# Patient Record
Sex: MALE | Race: WHITE | NOT HISPANIC OR LATINO | Employment: OTHER
[De-identification: names, ages, dates, MRNs, and addresses within clinical notes are randomized per-mention and may not be internally consistent; named-entity substitution may affect disease eponyms.]

---

## 2019-11-02 ENCOUNTER — HEALTH MAINTENANCE LETTER (OUTPATIENT)
Age: 74
End: 2019-11-02

## 2020-01-19 ENCOUNTER — OFFICE VISIT (OUTPATIENT)
Dept: URGENT CARE | Facility: URGENT CARE | Age: 75
End: 2020-01-19
Payer: COMMERCIAL

## 2020-01-19 VITALS
DIASTOLIC BLOOD PRESSURE: 72 MMHG | HEIGHT: 70 IN | BODY MASS INDEX: 27.92 KG/M2 | TEMPERATURE: 99.5 F | SYSTOLIC BLOOD PRESSURE: 128 MMHG | RESPIRATION RATE: 16 BRPM | OXYGEN SATURATION: 98 % | HEART RATE: 78 BPM | WEIGHT: 195 LBS

## 2020-01-19 DIAGNOSIS — J10.1 INFLUENZA A: ICD-10-CM

## 2020-01-19 DIAGNOSIS — R05.9 COUGH: Primary | ICD-10-CM

## 2020-01-19 LAB
FLUAV+FLUBV AG SPEC QL: NEGATIVE
FLUAV+FLUBV AG SPEC QL: POSITIVE
SPECIMEN SOURCE: ABNORMAL

## 2020-01-19 PROCEDURE — 99202 OFFICE O/P NEW SF 15 MIN: CPT | Performed by: PHYSICIAN ASSISTANT

## 2020-01-19 PROCEDURE — 87804 INFLUENZA ASSAY W/OPTIC: CPT | Performed by: PHYSICIAN ASSISTANT

## 2020-01-19 RX ORDER — OSELTAMIVIR PHOSPHATE 75 MG/1
75 CAPSULE ORAL 2 TIMES DAILY
Qty: 10 CAPSULE | Refills: 0 | Status: SHIPPED | OUTPATIENT
Start: 2020-01-19 | End: 2020-01-24

## 2020-01-19 ASSESSMENT — MIFFLIN-ST. JEOR: SCORE: 1625.76

## 2020-01-19 NOTE — PROGRESS NOTES
"SUBJECTIVE:   Barry Yang is a 74 year old male presenting with a chief complaint of fever, chills, runny nose, cough - non-productive and body aches.  Onset of symptoms was 2 day(s) ago.  Course of illness is same.    Severity: moderately severe  Current and Associated symptoms: as above  Treatment measures tried include OTC Cough med.  Predisposing factors include None. Patient did get a flu shot this year.  Cough and fatigue are the most the bothersome    Past Medical History:   Diagnosis Date     Benign neoplasm of colon 08/20/05    COLONOSCOPY IN 5 YEARS     postural dizziness      Squamous cell carcinoma in situ      Urticaria, unspecified     chronic      Current Outpatient Medications   Medication Sig Dispense Refill     MULTIVITAMIN TABS   OR 1 TABLET DAILY       Social History     Tobacco Use     Smoking status: Never Smoker     Smokeless tobacco: Never Used   Substance Use Topics     Alcohol use: Yes     Comment: about 1-2 day       ROS:  Review of systems negative except as stated above.    OBJECTIVE:  /72   Pulse 78   Temp 99.5  F (37.5  C) (Oral)   Resp 16   Ht 1.778 m (5' 10\")   Wt 88.5 kg (195 lb)   SpO2 98%   BMI 27.98 kg/m    GENERAL APPEARANCE: healthy, alert and no distress  EYES: EOMI,  PERRL, conjunctiva clear  HENT: ear canals and TM's normal.  Nose and mouth without ulcers, erythema or lesions  NECK: supple, nontender, no lymphadenopathy  RESP: lungs clear to auscultation - no rales, rhonchi or wheezes  CV: regular rates and rhythm, normal S1 S2, no murmur noted  NEURO: Normal strength and tone, sensory exam grossly normal,  normal speech and mentation  SKIN: no suspicious lesions or rashes    Results for orders placed or performed in visit on 01/19/20   Influenza A/B antigen     Status: Abnormal   Result Value Ref Range    Influenza A/B Agn Specimen Nasal     Influenza A Positive (A) NEG^Negative    Influenza B Negative NEG^Negative       ASSESSMENT / PLAN:  1. Cough  - " Patient c/o intermittent painful urination for years and noticed blood in urine today. Influenza A/B antigen    2. Influenza A  Lungs are CTAB, no sign of respiratory distress. Throat without PTA or RPA and TM clear B/L. No nuchal rigidity or abd tenderness.  Flu A is +.  Encouraged fluids, rest and humidified air  Can use Mucinex DM (OTC) for cough     We discussed the limitations of influenza testing and limitations of  antiviral therapy against influenza, that treatment should usually be initiated within 2 days of the start of symptoms and is most critical for persons with weak immunity and chronic respiratory illnesses     Symptomatic therapy suggested:  Rest, drink lots of fluids,  Acetaminophen / ibuprofen for body aches and fever,  Salt water gargles for sore throat,  OTC anesthetic lozenges for sore throat,  OTC cough medications.   Call or return to clinic prn if these symptoms worsen or fail to improve as anticipated.    Treatment and prophylaxis for close contacts  was discussed  Advised that influenza illness usually lasts a week, sometimes more and that the patient should avoid contact with others, and cover the mouth when coughing to limit spread of the infection.    Discussed that influenza and similar illnesses are  potent viruses  that can cause damage to airways making increased risk for developing bronchitis or pneumonia.  In severe cases of chest symptoms an  antibiotic can treat the bacterial superinfection, but I explained that the antibiotic is not effective against the influenza or other respiratory viruses.      - oseltamivir (TAMIFLU) 75 MG capsule; Take 1 capsule (75 mg) by mouth 2 times daily for 5 days  Dispense: 10 capsule; Refill: 0    Lissette Travis PA-C

## 2020-01-19 NOTE — PATIENT INSTRUCTIONS
Patient Education     Influenza (Adult)    Influenza is also called the flu. It is a viral illness that affects the air passages of your lungs. It is different from the common cold. The flu can easily be passed from one to person to another. It may be spread through the air by coughing and sneezing. Or it can be spread by touching the sick person and then touching your own eyes, nose, or mouth.  The flu starts 1 to 3 days after you are exposed to the flu virus. It may last for 1 to 2 weeks but many people feel tired or fatigued for many weeks afterward. You usually don t need to take antibiotics unless you have a complication. This might be an ear or sinus infection or pneumonia.  Symptoms of the flu may be mild or severe. They can include extreme tiredness (wanting to stay in bed all day), chills, fevers, muscle aches, soreness with eye movement, headache, and a dry, hacking cough.  Home care  Follow these guidelines when caring for yourself at home:    Avoid being around cigarette smoke, whether yours or other people s.    Acetaminophen or ibuprofen will help ease your fever, muscle aches, and headache. Don t give aspirin to anyone younger than 18 who has the flu. Aspirin can harm the liver.    Nausea and loss of appetite are common with the flu. Eat light meals. Drink 6 to 8 glasses of liquids every day. Good choices are water, sport drinks, soft drinks without caffeine, juices, tea, and soup. Extra fluids will also help loosen secretions in your nose and lungs.    Over-the-counter cold medicines will not make the flu go away faster. But the medicines may help with coughing, sore throat, and congestion in your nose and sinuses. Don t use a decongestant if you have high blood pressure.    Stay home until your fever has been gone for at least 24 hours without using medicine to reduce fever.  Follow-up care  Follow up with your healthcare provider, or as advised, if you are not getting better over the next  week.  If you are age 65 or older, talk with your provider about getting a pneumococcal vaccine every 5 years. You should also get this vaccine if you have chronic asthma or COPD. All adults should get a flu vaccine every fall. Ask your provider about this.  When to seek medical advice  Call your healthcare provider right away if any of these occur:    Cough with lots of colored mucus (sputum) or blood in your mucus    Chest pain, shortness of breath, wheezing, or trouble breathing    Severe headache, or face, neck, or ear pain    New rash with fever    Fever of 100.4 F (38 C) or higher, or as directed by your healthcare provider    Confusion, behavior change, or seizure    Severe weakness or dizziness    You get a new fever or cough after getting better for a few days  Date Last Reviewed: 1/1/2017 2000-2019 [employer]. [address] [place] [address], [place] [address]. All rights reserved. This information is not intended as a substitute for professional medical care. Always follow your healthcare professional's instructions.

## 2020-02-10 ENCOUNTER — HEALTH MAINTENANCE LETTER (OUTPATIENT)
Age: 75
End: 2020-02-10

## 2020-11-16 ENCOUNTER — HEALTH MAINTENANCE LETTER (OUTPATIENT)
Age: 75
End: 2020-11-16

## 2020-12-03 ENCOUNTER — TRANSFERRED RECORDS (OUTPATIENT)
Dept: HEALTH INFORMATION MANAGEMENT | Facility: CLINIC | Age: 75
End: 2020-12-03

## 2021-01-04 ENCOUNTER — TRANSFERRED RECORDS (OUTPATIENT)
Dept: HEALTH INFORMATION MANAGEMENT | Facility: CLINIC | Age: 76
End: 2021-01-04

## 2021-01-19 ENCOUNTER — TRANSCRIBE ORDERS (OUTPATIENT)
Dept: OTHER | Age: 76
End: 2021-01-19

## 2021-01-19 DIAGNOSIS — N40.1 BENIGN PROSTATIC HYPERPLASIA WITH LOWER URINARY TRACT SYMPTOMS: Primary | ICD-10-CM

## 2021-01-26 ENCOUNTER — TRANSCRIBE ORDERS (OUTPATIENT)
Dept: OTHER | Age: 76
End: 2021-01-26

## 2021-01-26 DIAGNOSIS — C61 PROSTATE CANCER (H): Primary | ICD-10-CM

## 2021-02-04 NOTE — PROGRESS NOTES
RADIATION ONCOLOGY CONSULTATION  [facility]    DATE:  February 3, 2021    PATIENT NAME: Barry Yang  MEDICAL RECORD NUMBER: [id number]    REFERRING PHYSICIAN:  Dr. Lex Upton    REASON FOR CONSULTATION: Consideration of  definitive radiotherapy for management of adenocarcinoma of the prostate.    Staging:  Clinical  T2c N0 M0  Malina's score:   Allen's score: 4+5=9  PSA:     12/11/20  57.3  12/03/20  55.5      NCCN :    Very high risk (T3b-T4, Allen primary 5, >4 cores with Malina 8-10)  MARIZOL RISK: Organ Confined(OC): 72%      Extra Prostattic Extension(EPE+): 24%      Seminal Vesicle Involvement(SV)+: 3%      Lymph Node Involvement (LN)+: 1    CHAN RISK:  OC: 1%       EPE+: 99%     SV+: 95%       LN+: 93%        HISTORY OF PRESENT ILLNESS: Mr. Yang is a 75 year old gentleman who was found to have an elevated PSA of 55.5 on 12/03/20. This PSA was repeated on 12/11/20 and was 57.3. He was thus referred to Urology for concern of malignancy. He has been having some LUTS notable for frequency, sensation of incomplete bladder emptying, and nocutria. He was initiated on trospium by Urology and noted a significant improvement of nocturia from 4-5 times nightly to once.     [facility] who noted a diffusely enlarged prostate. He proceeded to needle biopsy. A total of 13 cores were taken, with 12/13 positive for Malina grade 4+5=9 adenocarcinoma. In specimen 1 on the right 75% of the tissue was involved with adenocarcinoma and perineural invasion was present.     [facility] and CT Abdomen/Pelvis. The NM bone scan on 1/4/21 was negative for osseous metastases. The CT Abdomen/Pelvis on 12/28/20 demonstrated an enlarged prostate with asymmetric right-sided prominent of the right aspect of the prostate at the base with possible soft tissue involvement at the base of the right seminal vesicle,  as well as a quoted borderline right common iliac chain node.     Mr. Yang and his wife join us today in person for consultation. He states that he had been having urinary issues that have been ongoing and worsening for the last 3 years. He is up four to five times nightly. He has also started having pain due to trouble initiating his stream. Once he starts, he is able to maintain a steady stream. He denies UI. His AUA is 24/35, CHAD 24/25. He states that he does not want a TURP and instead wants to pursue radiation soon. Regarding his ADT, he received his first shot of Eligard on 1/13 in Urology at the [facility], and has noted hot flashes but has otherwise been tolerating it.       PMH:   - Squamous cell carcinoma in situ  - Chronic urticaria  - Postural dizziness  - Benign neoplasm of colon    PSH:  - Knee arthroscopy    MEDICATIONS:  - Multivitamin    ALLERGY:  - No known allergies    FAMILY HISTORY:  - no known family history of cancer    SOCIAL HISTORY  - Served in [employer] in the [employer], has   -  to Danielle, resides in [place]  - Never smoker  - 1-2 servings of alcohol daily    ECOG PERFORMANCE STATUS: 0    HISTORY OF RADIATION: no  HISTORY OF IBD: no  IMPLANTED CARDIAC DEVICE: no     ROS: 10 point ROS reviewed as reported on the nursing assessment note.      PHYSICAL EXAMINATION:  VS: BP (!) 163/92   Pulse 70   Wt 90.7 kg (200 lb)   SpO2 95%   BMI 28.70 kg/m      GENERAL: well appearing male in no apparent distress  ABDOMEN:  Soft and non tender without mass.    RADIOLOGY:  BONE SCAN: 01/04/2021  No scintigraphic evidence for osseous metastasis.    CT SCAN: 12/28/20  Findings: Prostate is enlarged measuring approximately 4.3 x 4.8 x 5.9 cm (AP x TR x SI). Prostatic volume approximates 63 mL. Slightly heterogeneous internal attenuation with asymmetric prominence of the right aspect of the prostate at the level of the base. Nodular intravesical protrusion/extrusion. Equivocal soft tissue  attenuation involving the right seminal vesicle (best seen on series 4 image 100). Small amount of periprosthetic inflammation at the rectal prostatic angle could relate to prior biopsy.     A few prominent, but nonenlarged nodes in the pelvis. For example, borderline right common iliac chain node measuring 9-10 mm (series 2, image 242). Small left common iliac chain node on series 2 image 237 measures 5 mm.           Right common iliac chain node    IMPRESSION:  High risk (T3a, [place] 8-10, PSA>20) adenocarcinoma of prostate.     RECOMMENDATION:   While we considered the hypofractionated radiotherapy with simultaneous integrated boost to treat the seminal vesicles and lymph nodes, in light of his significant urinary symtpoms we would be concerned about >Grade 2  toxicity as demonstrated in the O Pollok study. With this in mind, we discussed with the patient the [place] regimen versus conventional fractionation radiotherapy. We ultimately have opted towards conventional radiotherapy, to consist of 9 weeks of treatment to treat the prostate, seminal vesicles and lymph nodes.  This will be in conjunction with long term (at least 2 years) of ADT, which the patient has already been initiated. [facility] The patient was amenable to this plan.    We will plan on having the patient return for simulation in the next 1-2 weeks as well as a planning MRI. I reviewed my recommendations with the patient and answered all questions. I also discussed alternative therapies including possible surgery and medical management.     I explained the benefits of radiotherapy as well as related toxicities. I described the early and late toxicities associated with radiotherapy. The toxicities are itemized on the consent form the prostate radiotherapy. I also discussed in detail the toxicities associated with long term ADT, which include hot flashes, weight gain, mood swing, joint pain, osteoporosis,  dyslipidemia, increased risk of heart disease/stroke/MI, erectile dysfunction and decreased libido, and dementia. We encouraged Mr. Yang and his wife to ask questions, which we answered to the best of our ability.     We will plan to schedule Mr. Yang next week for simulation.    The patient was seen and examined with Dr. Brunson.    Selina Vila MD PGY2  Department of Radiation Oncology  [phone]      I saw the patient with the resident.  I agree with the resident's note and plan of care.      CHITO Brunson M.D.  Department of Radiation Oncology  [facility]    60 minutes spent on the date of the encounter doing chart review, review of outside records, interpretation of tests, patient visit, documentation and discussion with family

## 2021-02-05 ENCOUNTER — OFFICE VISIT (OUTPATIENT)
Dept: RADIATION ONCOLOGY | Facility: CLINIC | Age: 76
End: 2021-02-05
Attending: NURSE PRACTITIONER
Payer: COMMERCIAL

## 2021-02-05 VITALS
WEIGHT: 200 LBS | OXYGEN SATURATION: 95 % | DIASTOLIC BLOOD PRESSURE: 92 MMHG | SYSTOLIC BLOOD PRESSURE: 163 MMHG | HEART RATE: 70 BPM | BODY MASS INDEX: 28.7 KG/M2

## 2021-02-05 DIAGNOSIS — C61 MALIGNANT NEOPLASM OF PROSTATE (H): Primary | ICD-10-CM

## 2021-02-05 PROCEDURE — G0463 HOSPITAL OUTPT CLINIC VISIT: HCPCS | Performed by: RADIOLOGY

## 2021-02-05 RX ORDER — OMEGA-3/DHA/EPA/FISH OIL 60 MG-90MG
CAPSULE ORAL
COMMUNITY

## 2021-02-05 RX ORDER — TROSPIUM CHLORIDE 20 MG/1
TABLET, FILM COATED ORAL
COMMUNITY
Start: 2020-12-03 | End: 2021-02-05

## 2021-02-05 RX ORDER — TAMSULOSIN HYDROCHLORIDE 0.4 MG/1
CAPSULE ORAL
COMMUNITY
Start: 2020-02-20 | End: 2021-02-05

## 2021-02-05 RX ORDER — LACTOBACILLUS RHAMNOSUS GG 10B CELL
1 CAPSULE ORAL 2 TIMES DAILY
COMMUNITY

## 2021-02-05 SDOH — ECONOMIC STABILITY: TRANSPORTATION INSECURITY
IN THE PAST 12 MONTHS, HAS THE LACK OF TRANSPORTATION KEPT YOU FROM MEDICAL APPOINTMENTS OR FROM GETTING MEDICATIONS?: NOT ASKED

## 2021-02-05 SDOH — ECONOMIC STABILITY: FOOD INSECURITY: WITHIN THE PAST 12 MONTHS, YOU WORRIED THAT YOUR FOOD WOULD RUN OUT BEFORE YOU GOT MONEY TO BUY MORE.: NOT ASKED

## 2021-02-05 SDOH — HEALTH STABILITY: MENTAL HEALTH: HOW OFTEN DO YOU HAVE A DRINK CONTAINING ALCOHOL?: NOT ASKED

## 2021-02-05 SDOH — HEALTH STABILITY: MENTAL HEALTH: HOW MANY STANDARD DRINKS CONTAINING ALCOHOL DO YOU HAVE ON A TYPICAL DAY?: NOT ASKED

## 2021-02-05 SDOH — ECONOMIC STABILITY: FOOD INSECURITY: WITHIN THE PAST 12 MONTHS, THE FOOD YOU BOUGHT JUST DIDN'T LAST AND YOU DIDN'T HAVE MONEY TO GET MORE.: NOT ASKED

## 2021-02-05 SDOH — ECONOMIC STABILITY: INCOME INSECURITY: HOW HARD IS IT FOR YOU TO PAY FOR THE VERY BASICS LIKE FOOD, HOUSING, MEDICAL CARE, AND HEATING?: NOT ASKED

## 2021-02-05 SDOH — HEALTH STABILITY: MENTAL HEALTH: HOW OFTEN DO YOU HAVE 6 OR MORE DRINKS ON ONE OCCASION?: NOT ASKED

## 2021-02-05 SDOH — ECONOMIC STABILITY: TRANSPORTATION INSECURITY
IN THE PAST 12 MONTHS, HAS LACK OF TRANSPORTATION KEPT YOU FROM MEETINGS, WORK, OR FROM GETTING THINGS NEEDED FOR DAILY LIVING?: NOT ASKED

## 2021-02-05 ASSESSMENT — ENCOUNTER SYMPTOMS
HEMATURIA: 0
WEIGHT LOSS: 0
EYE PAIN: 0
COUGH: 0
CHILLS: 0
NAUSEA: 0
FALLS: 0
NECK PAIN: 0
DIARRHEA: 0
SHORTNESS OF BREATH: 0
DYSURIA: 0
DOUBLE VISION: 0
FEVER: 0
BLURRED VISION: 0
FREQUENCY: 0
INSOMNIA: 0
BACK PAIN: 0
HEADACHES: 0
SEIZURES: 0
SORE THROAT: 0
DEPRESSION: 1
NERVOUS/ANXIOUS: 1
VOMITING: 0
BLOOD IN STOOL: 0
DIAPHORESIS: 1
BRUISES/BLEEDS EASILY: 0
TINGLING: 0
DIZZINESS: 0
CONSTIPATION: 0

## 2021-02-05 NOTE — PROGRESS NOTES
HPI  INITIAL PATIENT ASSESSMENT    Diagnosis: Prostate cancer    Prior radiation therapy: None    Prior chemotherapy: None    Prior hormonal therapy:Yes: Lupron injection 1/13/2021    Pain Eval:  Denies    Psychosocial  Living arrangements: Wife  Fall Risk: independent  Falls Risk Screening Questions - Radiation Consult    1. Have you fallen in the past one week?  No.  2. Have you felt unsteady when walking or standing in the past one week?  No.     referral needs: Not needed    Advanced Directive: Yes - Location: in chart  Implantable Cardiac Device? No    Review of Systems   Constitutional: Positive for diaphoresis (Hot flashes r/t Lupron). Negative for chills, fever, malaise/fatigue and weight loss.   HENT: Negative for ear pain, nosebleeds and sore throat.    Eyes: Negative for blurred vision, double vision and pain.   Respiratory: Negative for cough and shortness of breath.    Cardiovascular: Negative for chest pain and leg swelling.   Gastrointestinal: Negative for blood in stool, constipation, diarrhea, nausea and vomiting.   Genitourinary: Negative for dysuria, frequency, hematuria and urgency.   Musculoskeletal: Negative for back pain, falls, joint pain and neck pain.   Skin: Negative for rash.   Neurological: Negative for dizziness, tingling, seizures and headaches.   Endo/Heme/Allergies: Does not bruise/bleed easily.   Psychiatric/Behavioral: Positive for depression (Is looking at getting some counsling, primary MD working on). The patient is nervous/anxious (Has a hx of anxiety at times). The patient does not have insomnia.        Nurse face-to-face time: Level 4:  15 min face to face time

## 2021-02-05 NOTE — LETTER
2/5/2021         RE: Barry Yang  [address]  [facility] [address]        Dear Colleague,    Thank you for referring your patient, Barry Yang, to the [facility] RADIATION ONCOLOGY. Please see a copy of my visit note below.      RADIATION ONCOLOGY CONSULTATION  [facility]    DATE:  February 3, 2021    PATIENT NAME: Barry Yang  MEDICAL RECORD NUMBER: [id number]    REFERRING PHYSICIAN:  Dr. Lex Upton    REASON FOR CONSULTATION: Consideration of  definitive radiotherapy for management of adenocarcinoma of the prostate.    Staging:  Clinical  T2c N0 M0  Strong's score:   Strong's score: 4+5=9  PSA:     12/11/20  57.3  12/03/20  55.5      NCCN :    Very high risk (T3b-T4, Strong primary 5, >4 cores with Malina 8-10)  MARIZOL RISK: Organ Confined(OC): 72%      Extra Prostattic Extension(EPE+): 24%      Seminal Vesicle Involvement(SV)+: 3%      Lymph Node Involvement (LN)+: 1    CHAN RISK:  OC: 1%       EPE+: 99%     SV+: 95%       LN+: 93%        HISTORY OF PRESENT ILLNESS: Mr. Yang is a 75 year old gentleman who was found to have an elevated PSA of 55.5 on 12/03/20. This PSA was repeated on 12/11/20 and was 57.3. He was thus referred to Urology for concern of malignancy. He has been having some LUTS notable for frequency, sensation of incomplete bladder emptying, and nocutria. He was initiated on trospium by Urology and noted a significant improvement of nocturia from 4-5 times nightly to once.     [facility]orensen who noted a diffusely enlarged prostate. He proceeded to needle biopsy. A total of 13 cores were taken, with 12/13 positive for Malina grade 4+5=9 adenocarcinoma. In specimen 1 on the right 75% of the tissue was involved with adenocarcinoma and perineural invasion was present.     [facility] and CT Abdomen/Pelvis. The NM bone scan on 1/4/21 was negative  for osseous metastases. The CT Abdomen/Pelvis on 12/28/20 demonstrated an enlarged prostate with asymmetric right-sided prominent of the right aspect of the prostate at the base with possible soft tissue involvement at the base of the right seminal vesicle, as well as a quoted borderline right common iliac chain node.     Mr. Yang and his wife join us today in person for consultation. He states that he had been having urinary issues that have been ongoing and worsening for the last 3 years. He is up four to five times nightly. He has also started having pain due to trouble initiating his stream. Once he starts, he is able to maintain a steady stream. He denies UI. His AUA is 24/35, CHAD 24/25. He states that he does not want a TURP and instead wants to pursue radiation soon. Regarding his ADT, he received his first shot of Eligard on 1/13 in Urology at the [facility], and has noted hot flashes but has otherwise been tolerating it.       PMH:   - Squamous cell carcinoma in situ  - Chronic urticaria  - Postural dizziness  - Benign neoplasm of colon    PSH:  - Knee arthroscopy    MEDICATIONS:  - Multivitamin    ALLERGY:  - No known allergies    FAMILY HISTORY:  - no known family history of cancer    SOCIAL HISTORY  - Served in [employer] in the [employer], has   -  to Danielle, resides in [place]  - Never smoker  - 1-2 servings of alcohol daily    ECOG PERFORMANCE STATUS: 0    HISTORY OF RADIATION: no  HISTORY OF IBD: no  IMPLANTED CARDIAC DEVICE: no     ROS: 10 point ROS reviewed as reported on the nursing assessment note.      PHYSICAL EXAMINATION:  VS: BP (!) 163/92   Pulse 70   Wt 90.7 kg (200 lb)   SpO2 95%   BMI 28.70 kg/m      GENERAL: well appearing male in no apparent distress  ABDOMEN:  Soft and non tender without mass.    RADIOLOGY:  BONE SCAN: 01/04/2021  No scintigraphic evidence for osseous metastasis.    CT SCAN: 12/28/20  Findings: Prostate is enlarged measuring approximately 4.3 x 4.8 x 5.9 cm  (AP x TR x SI). Prostatic volume approximates 63 mL. Slightly heterogeneous internal attenuation with asymmetric prominence of the right aspect of the prostate at the level of the base. Nodular intravesical protrusion/extrusion. Equivocal soft tissue attenuation involving the right seminal vesicle (best seen on series 4 image 100). Small amount of periprosthetic inflammation at the rectal prostatic angle could relate to prior biopsy.     A few prominent, but nonenlarged nodes in the pelvis. For example, borderline right common iliac chain node measuring 9-10 mm (series 2, image 242). Small left common iliac chain node on series 2 image 237 measures 5 mm.           Right common iliac chain node    IMPRESSION:  High risk (T3a, Malina 8-10, PSA>20) adenocarcinoma of prostate.     RECOMMENDATION:   While we considered the hypofractionated radiotherapy with simultaneous integrated boost to treat the seminal vesicles and lymph nodes, in light of his significant urinary symtpoms we would be concerned about >Grade 2  toxicity as demonstrated in the O Pollok study. With this in mind, we discussed with the patient the [place] regimen versus conventional fractionation radiotherapy. We ultimately have opted towards conventional radiotherapy, to consist of 9 weeks of treatment to treat the prostate, seminal vesicles and lymph nodes.  This will be in conjunction with long term (at least 2 years) of ADT, which the patient has already been initiated. [facility] The patient was amenable to this plan.    We will plan on having the patient return for simulation in the next 1-2 weeks as well as a planning MRI. I reviewed my recommendations with the patient and answered all questions. I also discussed alternative therapies including possible surgery and medical management.     I explained the benefits of radiotherapy as well as related toxicities. I described the early and late toxicities associated  with radiotherapy. The toxicities are itemized on the consent form the prostate radiotherapy. I also discussed in detail the toxicities associated with long term ADT, which include hot flashes, weight gain, mood swing, joint pain, osteoporosis, dyslipidemia, increased risk of heart disease/stroke/MI, erectile dysfunction and decreased libido, and dementia. We encouraged Mr. Yang and his wife to ask questions, which we answered to the best of our ability.     We will plan to schedule Mr. Yang next week for simulation.    The patient was seen and examined with Dr. Brunson.    Selina Vila MD PGY2  Department of Radiation Oncology  [phone]      I saw the patient with the resident.  I agree with the resident's note and plan of care.      CHITO Brunson M.D.  Department of Radiation Oncology  [facility]    60 minutes spent on the date of the encounter doing chart review, review of outside records, interpretation of tests, patient visit, documentation and discussion with family          HPI  INITIAL PATIENT ASSESSMENT    Diagnosis: Prostate cancer    Prior radiation therapy: None    Prior chemotherapy: None    Prior hormonal therapy:Yes: Lupron injection 1/13/2021    Pain Eval:  Denies    Psychosocial  Living arrangements: Wife  Fall Risk: independent  Falls Risk Screening Questions - Radiation Consult    1. Have you fallen in the past one week?  No.  2. Have you felt unsteady when walking or standing in the past one week?  No.     referral needs: Not needed    Advanced Directive: Yes - Location: in chart  Implantable Cardiac Device? No    Review of Systems   Constitutional: Positive for diaphoresis (Hot flashes r/t Lupron). Negative for chills, fever, malaise/fatigue and weight loss.   HENT: Negative for ear pain, nosebleeds and sore throat.    Eyes: Negative for blurred vision, double vision and pain.   Respiratory: Negative for cough and shortness of breath.     Cardiovascular: Negative for chest pain and leg swelling.   Gastrointestinal: Negative for blood in stool, constipation, diarrhea, nausea and vomiting.   Genitourinary: Negative for dysuria, frequency, hematuria and urgency.   Musculoskeletal: Negative for back pain, falls, joint pain and neck pain.   Skin: Negative for rash.   Neurological: Negative for dizziness, tingling, seizures and headaches.   Endo/Heme/Allergies: Does not bruise/bleed easily.   Psychiatric/Behavioral: Positive for depression (Is looking at getting some counsling, primary MD working on). The patient is nervous/anxious (Has a hx of anxiety at times). The patient does not have insomnia.        Nurse face-to-face time: Level 4:  15 min face to face time              Again, thank you for allowing me to participate in the care of your patient.        Sincerely,        Guru Brunson MD

## 2021-02-08 LAB — COPATH REPORT: NORMAL

## 2021-02-08 PROCEDURE — 999N001032 HC STATISTIC REVIEW OUTSIDE SLIDES TC 88321: Performed by: RADIOLOGY

## 2021-02-08 PROCEDURE — 88321 CONSLTJ&REPRT SLD PREP ELSWR: CPT | Performed by: PATHOLOGY

## 2021-02-11 ENCOUNTER — TELEPHONE (OUTPATIENT)
Dept: RADIATION ONCOLOGY | Facility: CLINIC | Age: 76
End: 2021-02-11

## 2021-02-11 NOTE — TELEPHONE ENCOUNTER
A call was made to pt to remind him of his sim appt on 2/16 and to come to appt with a full bladder and an empty bladder.  Pt understands instructions and asked appropriate questions.

## 2021-02-16 ENCOUNTER — HOSPITAL ENCOUNTER (OUTPATIENT)
Dept: MRI IMAGING | Facility: CLINIC | Age: 76
Discharge: HOME | End: 2021-02-16
Attending: RADIOLOGY | Admitting: RADIOLOGY
Payer: COMMERCIAL

## 2021-02-16 ENCOUNTER — ALLIED HEALTH/NURSE VISIT (OUTPATIENT)
Dept: RADIATION ONCOLOGY | Facility: CLINIC | Age: 76
End: 2021-02-16
Attending: RADIOLOGY
Payer: COMMERCIAL

## 2021-02-16 DIAGNOSIS — C61 MALIGNANT NEOPLASM OF PROSTATE (H): ICD-10-CM

## 2021-02-16 DIAGNOSIS — C61 MALIGNANT NEOPLASM OF PROSTATE (H): Primary | ICD-10-CM

## 2021-02-16 PROCEDURE — 77263 THER RADIOLOGY TX PLNG CPLX: CPT | Performed by: RADIOLOGY

## 2021-02-16 PROCEDURE — 72195 MRI PELVIS W/O DYE: CPT

## 2021-02-16 PROCEDURE — 77334 RADIATION TREATMENT AID(S): CPT | Mod: 26 | Performed by: RADIOLOGY

## 2021-02-16 PROCEDURE — 72195 MRI PELVIS W/O DYE: CPT | Mod: 26 | Performed by: RADIOLOGY

## 2021-02-16 PROCEDURE — 77334 RADIATION TREATMENT AID(S): CPT | Performed by: RADIOLOGY

## 2021-02-16 NOTE — PROGRESS NOTES
Radiation Therapy Patient Education    Person involved with teaching: Patient    Patient educational needs for self management of treatment-related side effects assessment completed.  [facility] Patient Ed tab contains Patient Learning Assessment    Education Materials Given  Radiation Therapy and You    Educational Topics Discussed  Side effects expected, Pain management, Nutrition and weight loss and When to call MD/RN    Response To Teaching  Verbalizes understanding    GYN Only  Vaginal Dilator-given and educated: N/A    Referrals sent: None    Chemotherapy?  No

## 2021-02-16 NOTE — PROGRESS NOTES
Simulation Note    Date: February 16, 2021    Patient: Barry Yang    Diagnosis: Malignant neoplasm of prostate (H)    Type of Simulation:  Cure/ Definitive     Patient Position: Supine    Patient Immobilization: Custom:  Vac-Loc    Simulation Aid(s): Urethrogram    Image Acquisition: Conventional CT simulation without 4D    Total Dose Planned: 7020 cGy      Dose/fraction:270 cGy    Energy of machine:  10 MV           Type of Radiotherapy Technique: IMRT(Intensity Modulated Radiotherapy)      Continuing Physcis/Dosimetry  and Dose calculations are ordered.  Simple simulations will be done prior to new start and changes in fields.  Weekly on treat visit.      CHITO Brunson M.D.  Department of Radiation Oncology  [facility]

## 2021-02-25 ENCOUNTER — APPOINTMENT (OUTPATIENT)
Dept: RADIATION ONCOLOGY | Facility: CLINIC | Age: 76
End: 2021-02-25
Attending: RADIOLOGY
Payer: COMMERCIAL

## 2021-02-25 PROCEDURE — 77300 RADIATION THERAPY DOSE PLAN: CPT | Mod: 26 | Performed by: RADIOLOGY

## 2021-02-25 PROCEDURE — 77301 RADIOTHERAPY DOSE PLAN IMRT: CPT | Performed by: RADIOLOGY

## 2021-02-25 PROCEDURE — 77300 RADIATION THERAPY DOSE PLAN: CPT | Performed by: RADIOLOGY

## 2021-02-25 PROCEDURE — 77338 DESIGN MLC DEVICE FOR IMRT: CPT | Performed by: RADIOLOGY

## 2021-02-25 PROCEDURE — 77301 RADIOTHERAPY DOSE PLAN IMRT: CPT | Mod: 26 | Performed by: RADIOLOGY

## 2021-02-25 PROCEDURE — 77338 DESIGN MLC DEVICE FOR IMRT: CPT | Mod: 26 | Performed by: RADIOLOGY

## 2021-03-02 ENCOUNTER — APPOINTMENT (OUTPATIENT)
Dept: RADIATION ONCOLOGY | Facility: CLINIC | Age: 76
End: 2021-03-02
Attending: RADIOLOGY
Payer: COMMERCIAL

## 2021-03-02 PROCEDURE — 77385 HC IMRT TREATMENT DELIVERY, SIMPLE: CPT | Performed by: RADIOLOGY

## 2021-03-03 ENCOUNTER — APPOINTMENT (OUTPATIENT)
Dept: RADIATION ONCOLOGY | Facility: CLINIC | Age: 76
End: 2021-03-03
Attending: RADIOLOGY
Payer: COMMERCIAL

## 2021-03-03 PROCEDURE — 77385 HC IMRT TREATMENT DELIVERY, SIMPLE: CPT | Performed by: RADIOLOGY

## 2021-03-03 PROCEDURE — 77014 PR CT GUIDE FOR PLACEMENT RADIATION THERAPY FIELDS: CPT | Mod: 26 | Performed by: RADIOLOGY

## 2021-03-04 ENCOUNTER — OFFICE VISIT (OUTPATIENT)
Dept: RADIATION ONCOLOGY | Facility: CLINIC | Age: 76
End: 2021-03-04
Attending: RADIOLOGY
Payer: COMMERCIAL

## 2021-03-04 VITALS
SYSTOLIC BLOOD PRESSURE: 152 MMHG | WEIGHT: 199 LBS | HEART RATE: 69 BPM | RESPIRATION RATE: 18 BRPM | BODY MASS INDEX: 28.49 KG/M2 | DIASTOLIC BLOOD PRESSURE: 92 MMHG | OXYGEN SATURATION: 97 % | HEIGHT: 70 IN

## 2021-03-04 DIAGNOSIS — C61 MALIGNANT NEOPLASM OF PROSTATE (H): Primary | ICD-10-CM

## 2021-03-04 PROCEDURE — 77014 PR CT GUIDE FOR PLACEMENT RADIATION THERAPY FIELDS: CPT | Mod: 26 | Performed by: RADIOLOGY

## 2021-03-04 PROCEDURE — 77385 HC IMRT TREATMENT DELIVERY, SIMPLE: CPT | Performed by: RADIOLOGY

## 2021-03-04 ASSESSMENT — MIFFLIN-ST. JEOR: SCORE: 1638.91

## 2021-03-04 NOTE — LETTER
3/4/2021         RE: Barry Yang  [address]  [facility] [address]        Dear Colleague,    Thank you for referring your patient, Barry Yang, to the [facility] RADIATION ONCOLOGY. Please see a copy of my visit note below.    WEEKLY MANAGEMENT NOTE  Radiation Oncology          Patient Name: Barry Yang  MRN: [id number]       Pelvis   600 cGy/ 8000 cGy   3/40         DAILY DOSE:     200  cGy/ day,  5 times/week  DISEASE UNDER TREATMENT: Adenocarcinoma of Prostate  Clinical  T2c N0 M0, [place]'s score: 4+5=9  PSA:57.3(12/11/2020). High AUA score(24/35) and obstructive symptoms.      ADT: Eligard 45 mg (1/13/2021)    SUBJECTIVE:    CTC V5.0 Toxicity Criteria  Fatigue: Grade 1: Fatigue relieved by rest  Pain Score:  0/10     :   Urinary Frequency/Urgency: Grade 0: No change from baseline  Urinary Incontinence: Grade 0: No change from baseline  Dysuria:Grade 0: No change from baseline  Nocturia: > 5 ( baseline >5)  Comment: Baseline urination >20 times/day    GI:  Diarrhea:Grade 0  No change over baseline  Proctitis: Grade 0 No symptom  Comment:      OBJECTIVE:  Wt Readings from Last 2 Encounters:   03/04/21 90.3 kg (199 lb)   02/05/21 90.7 kg (200 lb)         IMPRESSION: The patient is tolerating the treatment.  The patient set up, dose, and cone beam CT images were reviewed.      PLAN: Continue radiotherapy    PAIN MANAGEMENT PLAN: The patient does not require pain management    CHITO Brunson M.D.  Department of Radiation Oncology  [facility]

## 2021-03-04 NOTE — PROGRESS NOTES
WEEKLY MANAGEMENT NOTE  Radiation Oncology          Patient Name: Barry Yang  MRN: [id number]       Pelvis   600 cGy/ 8000 cGy   3/40         DAILY DOSE:     200  cGy/ day,  5 times/week  DISEASE UNDER TREATMENT: Adenocarcinoma of Prostate  Clinical  T2c N0 M0, [place]'s score: 4+5=9  PSA:57.3(12/11/2020). High AUA score(24/35) and obstructive symptoms.      ADT: Eligard 45 mg (1/13/2021)    SUBJECTIVE:    CTC V5.0 Toxicity Criteria  Fatigue: Grade 1: Fatigue relieved by rest  Pain Score:  0/10     :   Urinary Frequency/Urgency: Grade 0: No change from baseline  Urinary Incontinence: Grade 0: No change from baseline  Dysuria:Grade 0: No change from baseline  Nocturia: > 5 ( baseline >5)  Comment: Baseline urination >20 times/day    GI:  Diarrhea:Grade 0  No change over baseline  Proctitis: Grade 0 No symptom  Comment:      OBJECTIVE:  Wt Readings from Last 2 Encounters:   03/04/21 90.3 kg (199 lb)   02/05/21 90.7 kg (200 lb)         IMPRESSION: The patient is tolerating the treatment.  The patient set up, dose, and cone beam CT images were reviewed.      PLAN: Continue radiotherapy    PAIN MANAGEMENT PLAN: The patient does not require pain management    CHITO Brunson M.D.  Department of Radiation Oncology  [facility]

## 2021-03-05 ENCOUNTER — APPOINTMENT (OUTPATIENT)
Dept: RADIATION ONCOLOGY | Facility: CLINIC | Age: 76
End: 2021-03-05
Attending: RADIOLOGY
Payer: COMMERCIAL

## 2021-03-05 PROCEDURE — 77014 PR CT GUIDE FOR PLACEMENT RADIATION THERAPY FIELDS: CPT | Mod: 26 | Performed by: RADIOLOGY

## 2021-03-05 PROCEDURE — 77385 HC IMRT TREATMENT DELIVERY, SIMPLE: CPT | Performed by: RADIOLOGY

## 2021-03-08 ENCOUNTER — APPOINTMENT (OUTPATIENT)
Dept: RADIATION ONCOLOGY | Facility: CLINIC | Age: 76
End: 2021-03-08
Attending: RADIOLOGY
Payer: COMMERCIAL

## 2021-03-08 PROCEDURE — 77336 RADIATION PHYSICS CONSULT: CPT | Performed by: RADIOLOGY

## 2021-03-08 PROCEDURE — 77385 HC IMRT TREATMENT DELIVERY, SIMPLE: CPT | Performed by: RADIOLOGY

## 2021-03-08 PROCEDURE — 77427 RADIATION TX MANAGEMENT X5: CPT | Performed by: RADIOLOGY

## 2021-03-08 PROCEDURE — 77014 PR CT GUIDE FOR PLACEMENT RADIATION THERAPY FIELDS: CPT | Mod: 26 | Performed by: RADIOLOGY

## 2021-03-09 ENCOUNTER — APPOINTMENT (OUTPATIENT)
Dept: RADIATION ONCOLOGY | Facility: CLINIC | Age: 76
End: 2021-03-09
Attending: RADIOLOGY
Payer: COMMERCIAL

## 2021-03-09 PROCEDURE — 77385 HC IMRT TREATMENT DELIVERY, SIMPLE: CPT | Performed by: RADIOLOGY

## 2021-03-09 PROCEDURE — 77014 PR CT GUIDE FOR PLACEMENT RADIATION THERAPY FIELDS: CPT | Mod: 26 | Performed by: RADIOLOGY

## 2021-03-10 ENCOUNTER — APPOINTMENT (OUTPATIENT)
Dept: RADIATION ONCOLOGY | Facility: CLINIC | Age: 76
End: 2021-03-10
Attending: RADIOLOGY
Payer: COMMERCIAL

## 2021-03-10 PROCEDURE — 77014 PR CT GUIDE FOR PLACEMENT RADIATION THERAPY FIELDS: CPT | Mod: 26 | Performed by: RADIOLOGY

## 2021-03-10 PROCEDURE — 77385 HC IMRT TREATMENT DELIVERY, SIMPLE: CPT | Performed by: RADIOLOGY

## 2021-03-11 ENCOUNTER — APPOINTMENT (OUTPATIENT)
Dept: RADIATION ONCOLOGY | Facility: CLINIC | Age: 76
End: 2021-03-11
Attending: RADIOLOGY
Payer: COMMERCIAL

## 2021-03-11 VITALS
WEIGHT: 199 LBS | SYSTOLIC BLOOD PRESSURE: 146 MMHG | DIASTOLIC BLOOD PRESSURE: 102 MMHG | OXYGEN SATURATION: 99 % | HEART RATE: 80 BPM | BODY MASS INDEX: 28.55 KG/M2

## 2021-03-11 DIAGNOSIS — C61 MALIGNANT NEOPLASM OF PROSTATE (H): Primary | ICD-10-CM

## 2021-03-11 PROCEDURE — 77014 PR CT GUIDE FOR PLACEMENT RADIATION THERAPY FIELDS: CPT | Mod: 26 | Performed by: RADIOLOGY

## 2021-03-11 PROCEDURE — 77385 HC IMRT TREATMENT DELIVERY, SIMPLE: CPT | Performed by: RADIOLOGY

## 2021-03-11 NOTE — LETTER
3/11/2021         RE: Barry Yang  [address]  [facility] [address]        Dear Colleague,    Thank you for referring your patient, Barry Yang, to the [facility] RADIATION ONCOLOGY. Please see a copy of my visit note below.    WEEKLY MANAGEMENT NOTE  Radiation Oncology          Patient Name: Barry Yang  MRN: [id number]       Pelvis  1518 cGy/ 8000 cGy  6/40         DAILY DOSE:     200  cGy/ day,  5 times/week  DISEASE UNDER TREATMENT: Adenocarcinoma of Prostate  Clinical  T2c N0 M0, [place]'s score: 4+5=9  PSA:57.3(12/11/2020). High AUA score(24/35) and obstructive symptoms.      ADT: Eligard 45 mg (1/13/2021)    SUBJECTIVE:    CTC V5.0 Toxicity Criteria  Fatigue: Grade 1: Fatigue relieved by rest  Pain Score:  0/10     :   Urinary Frequency/Urgency: Grade 0: No change from baseline  Urinary Incontinence: Grade 0: No change from baseline  Dysuria:Grade 0: No change from baseline  Nocturia: > 5 ( baseline >6-7)  Comment: Baseline urination >20 times/day    GI:  Diarrhea:Grade 0  No change over baseline  Proctitis: Grade 0 No symptom  Comment:      OBJECTIVE:  Wt Readings from Last 2 Encounters:   03/11/21 90.3 kg (199 lb)   03/04/21 90.3 kg (199 lb)         IMPRESSION: The patient is tolerating the treatment.  The patient set up, dose, and cone beam CT images were reviewed.      PLAN: Continue radiotherapy    PAIN MANAGEMENT PLAN: The patient does not require pain management    CHITO Brunson M.D.  Department of Radiation Oncology  [facility]

## 2021-03-11 NOTE — PROGRESS NOTES
WEEKLY MANAGEMENT NOTE  Radiation Oncology          Patient Name: Barry Yang  MRN: [id number]       Pelvis  1518 cGy/ 8000 cGy  6/40         DAILY DOSE:     200  cGy/ day,  5 times/week  DISEASE UNDER TREATMENT: Adenocarcinoma of Prostate  Clinical  T2c N0 M0, Malina's score: 4+5=9  PSA:57.3(12/11/2020). High AUA score(24/35) and obstructive symptoms.      ADT: Eligard 45 mg (1/13/2021)    SUBJECTIVE:    CTC V5.0 Toxicity Criteria  Fatigue: Grade 1: Fatigue relieved by rest  Pain Score:  0/10     :   Urinary Frequency/Urgency: Grade 0: No change from baseline  Urinary Incontinence: Grade 0: No change from baseline  Dysuria:Grade 0: No change from baseline  Nocturia: > 5 ( baseline >6-7)  Comment: Baseline urination >20 times/day    GI:  Diarrhea:Grade 0  No change over baseline  Proctitis: Grade 0 No symptom  Comment:      OBJECTIVE:  Wt Readings from Last 2 Encounters:   03/11/21 90.3 kg (199 lb)   03/04/21 90.3 kg (199 lb)         IMPRESSION: The patient is tolerating the treatment.  The patient set up, dose, and cone beam CT images were reviewed.      PLAN: Continue radiotherapy    PAIN MANAGEMENT PLAN: The patient does not require pain management    CHITO Brunson M.D.  Department of Radiation Oncology  [facility]

## 2021-03-12 ENCOUNTER — APPOINTMENT (OUTPATIENT)
Dept: RADIATION ONCOLOGY | Facility: CLINIC | Age: 76
End: 2021-03-12
Attending: RADIOLOGY
Payer: COMMERCIAL

## 2021-03-12 PROCEDURE — 77014 PR CT GUIDE FOR PLACEMENT RADIATION THERAPY FIELDS: CPT | Mod: 26 | Performed by: RADIOLOGY

## 2021-03-12 PROCEDURE — 77385 HC IMRT TREATMENT DELIVERY, SIMPLE: CPT | Performed by: RADIOLOGY

## 2021-03-15 ENCOUNTER — APPOINTMENT (OUTPATIENT)
Dept: RADIATION ONCOLOGY | Facility: CLINIC | Age: 76
End: 2021-03-15
Attending: RADIOLOGY
Payer: COMMERCIAL

## 2021-03-15 PROCEDURE — 77427 RADIATION TX MANAGEMENT X5: CPT | Performed by: RADIOLOGY

## 2021-03-15 PROCEDURE — 77385 HC IMRT TREATMENT DELIVERY, SIMPLE: CPT | Performed by: RADIOLOGY

## 2021-03-15 PROCEDURE — 77336 RADIATION PHYSICS CONSULT: CPT | Performed by: RADIOLOGY

## 2021-03-15 PROCEDURE — 77014 PR CT GUIDE FOR PLACEMENT RADIATION THERAPY FIELDS: CPT | Mod: 26 | Performed by: RADIOLOGY

## 2021-03-16 ENCOUNTER — APPOINTMENT (OUTPATIENT)
Dept: RADIATION ONCOLOGY | Facility: CLINIC | Age: 76
End: 2021-03-16
Attending: RADIOLOGY
Payer: COMMERCIAL

## 2021-03-16 PROCEDURE — 77385 HC IMRT TREATMENT DELIVERY, SIMPLE: CPT | Performed by: RADIOLOGY

## 2021-03-16 PROCEDURE — 77014 PR CT GUIDE FOR PLACEMENT RADIATION THERAPY FIELDS: CPT | Mod: 26 | Performed by: RADIOLOGY

## 2021-03-17 ENCOUNTER — APPOINTMENT (OUTPATIENT)
Dept: RADIATION ONCOLOGY | Facility: CLINIC | Age: 76
End: 2021-03-17
Attending: RADIOLOGY
Payer: COMMERCIAL

## 2021-03-17 PROCEDURE — 77385 HC IMRT TREATMENT DELIVERY, SIMPLE: CPT | Performed by: RADIOLOGY

## 2021-03-17 PROCEDURE — 77014 PR CT GUIDE FOR PLACEMENT RADIATION THERAPY FIELDS: CPT | Mod: 26 | Performed by: RADIOLOGY

## 2021-03-18 ENCOUNTER — APPOINTMENT (OUTPATIENT)
Dept: RADIATION ONCOLOGY | Facility: CLINIC | Age: 76
End: 2021-03-18
Attending: RADIOLOGY
Payer: COMMERCIAL

## 2021-03-18 VITALS
SYSTOLIC BLOOD PRESSURE: 157 MMHG | BODY MASS INDEX: 28.84 KG/M2 | DIASTOLIC BLOOD PRESSURE: 100 MMHG | WEIGHT: 201 LBS | HEART RATE: 63 BPM

## 2021-03-18 DIAGNOSIS — C61 MALIGNANT NEOPLASM OF PROSTATE (H): Primary | ICD-10-CM

## 2021-03-18 PROCEDURE — 77385 HC IMRT TREATMENT DELIVERY, SIMPLE: CPT | Performed by: RADIOLOGY

## 2021-03-18 PROCEDURE — 77014 PR CT GUIDE FOR PLACEMENT RADIATION THERAPY FIELDS: CPT | Mod: 26 | Performed by: RADIOLOGY

## 2021-03-18 NOTE — PROGRESS NOTES
WEEKLY MANAGEMENT NOTE  Radiation Oncology          Patient Name: Barry Yang  MRN: [id number]       Pelvis  2518 cGy/ 8000 cGy  13/40         DAILY DOSE:     200  cGy/ day,  5 times/week  DISEASE UNDER TREATMENT: Adenocarcinoma of Prostate  Clinical  T2c N0 M0, [place]'s score: 4+5=9  PSA:57.3(12/11/2020). High AUA score(24/35) and obstructive symptoms.      ADT: Eligard 45 mg (1/13/2021)    SUBJECTIVE:    CTC V5.0 Toxicity Criteria  Fatigue: Grade 1: Fatigue relieved by rest  Pain Score:  0/10     :   Urinary Frequency/Urgency: Grade 0: No change from baseline  Urinary Incontinence: Grade 0: No change from baseline  Dysuria:Grade 0: No change from baseline  Nocturia: > 5 ( baseline >6-7)  Comment: Baseline urination >20 times/day    GI:  Diarrhea:Grade 0  No change over baseline  Proctitis: Grade 0 No symptom  Comment:      OBJECTIVE:  Wt Readings from Last 2 Encounters:   03/18/21 91.2 kg (201 lb)   03/11/21 90.3 kg (199 lb)         IMPRESSION: The patient is tolerating the treatment.  The patient set up, dose, and cone beam CT images were reviewed.      PLAN: Continue radiotherapy    PAIN MANAGEMENT PLAN: The patient does not require pain management    CHITO Brunson M.D.  Department of Radiation Oncology  [facility]

## 2021-03-18 NOTE — LETTER
3/18/2021         RE: Barry Yang  [address]  [facility] [address]        Dear Colleague,    Thank you for referring your patient, Barry Yang, to the [facility] RADIATION ONCOLOGY. Please see a copy of my visit note below.    WEEKLY MANAGEMENT NOTE  Radiation Oncology          Patient Name: Barry Yang  MRN: [id number]       Pelvis  2518 cGy/ 8000 cGy  13/40         DAILY DOSE:     200  cGy/ day,  5 times/week  DISEASE UNDER TREATMENT: Adenocarcinoma of Prostate  Clinical  T2c N0 M0, [place]'s score: 4+5=9  PSA:57.3(12/11/2020). High AUA score(24/35) and obstructive symptoms.      ADT: Eligard 45 mg (1/13/2021)    SUBJECTIVE:    CTC V5.0 Toxicity Criteria  Fatigue: Grade 1: Fatigue relieved by rest  Pain Score:  0/10     :   Urinary Frequency/Urgency: Grade 0: No change from baseline  Urinary Incontinence: Grade 0: No change from baseline  Dysuria:Grade 0: No change from baseline  Nocturia: > 5 ( baseline >6-7)  Comment: Baseline urination >20 times/day    GI:  Diarrhea:Grade 0  No change over baseline  Proctitis: Grade 0 No symptom  Comment:      OBJECTIVE:  Wt Readings from Last 2 Encounters:   03/18/21 91.2 kg (201 lb)   03/11/21 90.3 kg (199 lb)         IMPRESSION: The patient is tolerating the treatment.  The patient set up, dose, and cone beam CT images were reviewed.      PLAN: Continue radiotherapy    PAIN MANAGEMENT PLAN: The patient does not require pain management    CHITO Brunson M.D.  Department of Radiation Oncology  [facility]

## 2021-03-19 ENCOUNTER — APPOINTMENT (OUTPATIENT)
Dept: RADIATION ONCOLOGY | Facility: CLINIC | Age: 76
End: 2021-03-19
Attending: RADIOLOGY
Payer: COMMERCIAL

## 2021-03-19 PROCEDURE — 77014 PR CT GUIDE FOR PLACEMENT RADIATION THERAPY FIELDS: CPT | Mod: 26 | Performed by: RADIOLOGY

## 2021-03-19 PROCEDURE — 77385 HC IMRT TREATMENT DELIVERY, SIMPLE: CPT | Performed by: RADIOLOGY

## 2021-03-22 ENCOUNTER — APPOINTMENT (OUTPATIENT)
Dept: RADIATION ONCOLOGY | Facility: CLINIC | Age: 76
End: 2021-03-22
Attending: RADIOLOGY
Payer: COMMERCIAL

## 2021-03-22 PROCEDURE — 77385 HC IMRT TREATMENT DELIVERY, SIMPLE: CPT | Performed by: RADIOLOGY

## 2021-03-22 PROCEDURE — 77427 RADIATION TX MANAGEMENT X5: CPT | Performed by: RADIOLOGY

## 2021-03-22 PROCEDURE — 77336 RADIATION PHYSICS CONSULT: CPT | Performed by: RADIOLOGY

## 2021-03-22 PROCEDURE — 77014 PR CT GUIDE FOR PLACEMENT RADIATION THERAPY FIELDS: CPT | Mod: 26 | Performed by: RADIOLOGY

## 2021-03-23 ENCOUNTER — APPOINTMENT (OUTPATIENT)
Dept: RADIATION ONCOLOGY | Facility: CLINIC | Age: 76
End: 2021-03-23
Attending: RADIOLOGY
Payer: COMMERCIAL

## 2021-03-23 PROCEDURE — 77385 HC IMRT TREATMENT DELIVERY, SIMPLE: CPT | Performed by: RADIOLOGY

## 2021-03-23 PROCEDURE — 77014 PR CT GUIDE FOR PLACEMENT RADIATION THERAPY FIELDS: CPT | Mod: 26 | Performed by: RADIOLOGY

## 2021-03-24 ENCOUNTER — APPOINTMENT (OUTPATIENT)
Dept: RADIATION ONCOLOGY | Facility: CLINIC | Age: 76
End: 2021-03-24
Attending: RADIOLOGY
Payer: COMMERCIAL

## 2021-03-24 PROCEDURE — 77014 PR CT GUIDE FOR PLACEMENT RADIATION THERAPY FIELDS: CPT | Mod: 26 | Performed by: RADIOLOGY

## 2021-03-24 PROCEDURE — 77385 HC IMRT TREATMENT DELIVERY, SIMPLE: CPT | Performed by: RADIOLOGY

## 2021-03-25 ENCOUNTER — APPOINTMENT (OUTPATIENT)
Dept: RADIATION ONCOLOGY | Facility: CLINIC | Age: 76
End: 2021-03-25
Attending: RADIOLOGY
Payer: COMMERCIAL

## 2021-03-25 VITALS
BODY MASS INDEX: 29.31 KG/M2 | DIASTOLIC BLOOD PRESSURE: 89 MMHG | SYSTOLIC BLOOD PRESSURE: 132 MMHG | WEIGHT: 204.3 LBS | HEART RATE: 72 BPM | OXYGEN SATURATION: 98 %

## 2021-03-25 DIAGNOSIS — C61 MALIGNANT NEOPLASM OF PROSTATE (H): Primary | ICD-10-CM

## 2021-03-25 PROCEDURE — 77385 HC IMRT TREATMENT DELIVERY, SIMPLE: CPT | Performed by: RADIOLOGY

## 2021-03-25 NOTE — LETTER
3/25/2021         RE: Barry Yang  [address]  [facility] [address]        Dear Colleague,    Thank you for referring your patient, Barry Yang, to the [facility] RADIATION ONCOLOGY. Please see a copy of my visit note below.    WEEKLY MANAGEMENT NOTE  Radiation Oncology          Patient Name: Barry Yang  MRN: [id number]       Pelvis  3518 cGy/ 8000 cGy  18/40         DAILY DOSE:     200  cGy/ day,  5 times/week    DISEASE UNDER TREATMENT: Adenocarcinoma of Prostate  Clinical  T2c N0 M0, [place]'s score: 4+5=9  PSA:57.3(12/11/2020). High AUA score(24/35) and obstructive symptoms.      ADT: Eligard 45 mg (1/13/2021)    SUBJECTIVE:    CTC V5.0 Toxicity Criteria  Fatigue: Grade 1: Fatigue relieved by rest  Pain Score:  0/10     :   Urinary Frequency/Urgency: Grade 1: Increase in frequency or nocturia up to 2X normal  Urinary Incontinence: Grade 0: No change from baseline  Dysuria:Grade 0: No change from baseline  Nocturia: > 5 ( baseline >6-7)  Comment: Baseline urination >20 times/day    GI:  Diarrhea:Grade 0  No change over baseline  Proctitis: Grade 0 No symptom  Comment:      OBJECTIVE:  Wt Readings from Last 2 Encounters:   03/25/21 92.7 kg (204 lb 4.8 oz)   03/18/21 91.2 kg (201 lb)         IMPRESSION: The patient is tolerating the treatment.  The patient set up, dose, and cone beam CT images were reviewed.      PLAN: Continue radiotherapy. Flomax 0.4 mg BID    PAIN MANAGEMENT PLAN: The patient does not require pain management    CHITO Brunson M.D.  Department of Radiation Oncology  [facility]

## 2021-03-25 NOTE — PROGRESS NOTES
WEEKLY MANAGEMENT NOTE  Radiation Oncology          Patient Name: Barry Yang  MRN: [id number]       Pelvis  3518 cGy/ 8000 cGy  18/40         DAILY DOSE:     200  cGy/ day,  5 times/week    DISEASE UNDER TREATMENT: Adenocarcinoma of Prostate  Clinical  T2c N0 M0, [place]'s score: 4+5=9  PSA:57.3(12/11/2020). High AUA score(24/35) and obstructive symptoms.      ADT: Eligard 45 mg (1/13/2021)    SUBJECTIVE:    CTC V5.0 Toxicity Criteria  Fatigue: Grade 1: Fatigue relieved by rest  Pain Score:  0/10     :   Urinary Frequency/Urgency: Grade 1: Increase in frequency or nocturia up to 2X normal  Urinary Incontinence: Grade 0: No change from baseline  Dysuria:Grade 0: No change from baseline  Nocturia: > 5 ( baseline >6-7)  Comment: Baseline urination >20 times/day    GI:  Diarrhea:Grade 0  No change over baseline  Proctitis: Grade 0 No symptom  Comment:      OBJECTIVE:  Wt Readings from Last 2 Encounters:   03/25/21 92.7 kg (204 lb 4.8 oz)   03/18/21 91.2 kg (201 lb)         IMPRESSION: The patient is tolerating the treatment.  The patient set up, dose, and cone beam CT images were reviewed.      PLAN: Continue radiotherapy. Flomax 0.4 mg BID    PAIN MANAGEMENT PLAN: The patient does not require pain management    CHITO Brunson M.D.  Department of Radiation Oncology  [facility]

## 2021-03-26 ENCOUNTER — APPOINTMENT (OUTPATIENT)
Dept: RADIATION ONCOLOGY | Facility: CLINIC | Age: 76
End: 2021-03-26
Attending: RADIOLOGY
Payer: COMMERCIAL

## 2021-03-26 PROCEDURE — 77014 PR CT GUIDE FOR PLACEMENT RADIATION THERAPY FIELDS: CPT | Mod: 26 | Performed by: RADIOLOGY

## 2021-03-26 PROCEDURE — 77385 HC IMRT TREATMENT DELIVERY, SIMPLE: CPT | Performed by: RADIOLOGY

## 2021-03-29 ENCOUNTER — APPOINTMENT (OUTPATIENT)
Dept: RADIATION ONCOLOGY | Facility: CLINIC | Age: 76
End: 2021-03-29
Attending: RADIOLOGY
Payer: COMMERCIAL

## 2021-03-29 PROCEDURE — 77427 RADIATION TX MANAGEMENT X5: CPT | Performed by: RADIOLOGY

## 2021-03-29 PROCEDURE — 77014 PR CT GUIDE FOR PLACEMENT RADIATION THERAPY FIELDS: CPT | Mod: 26 | Performed by: RADIOLOGY

## 2021-03-29 PROCEDURE — 77336 RADIATION PHYSICS CONSULT: CPT | Performed by: RADIOLOGY

## 2021-03-29 PROCEDURE — 77385 HC IMRT TREATMENT DELIVERY, SIMPLE: CPT | Performed by: RADIOLOGY

## 2021-03-30 ENCOUNTER — APPOINTMENT (OUTPATIENT)
Dept: RADIATION ONCOLOGY | Facility: CLINIC | Age: 76
End: 2021-03-30
Attending: RADIOLOGY
Payer: COMMERCIAL

## 2021-03-30 PROCEDURE — 77014 PR CT GUIDE FOR PLACEMENT RADIATION THERAPY FIELDS: CPT | Mod: 26 | Performed by: RADIOLOGY

## 2021-03-30 PROCEDURE — 77385 HC IMRT TREATMENT DELIVERY, SIMPLE: CPT | Performed by: RADIOLOGY

## 2021-03-31 ENCOUNTER — APPOINTMENT (OUTPATIENT)
Dept: RADIATION ONCOLOGY | Facility: CLINIC | Age: 76
End: 2021-03-31
Attending: RADIOLOGY
Payer: COMMERCIAL

## 2021-03-31 PROCEDURE — 77385 HC IMRT TREATMENT DELIVERY, SIMPLE: CPT | Performed by: RADIOLOGY

## 2021-03-31 PROCEDURE — 77014 PR CT GUIDE FOR PLACEMENT RADIATION THERAPY FIELDS: CPT | Mod: 26 | Performed by: RADIOLOGY

## 2021-04-01 ENCOUNTER — APPOINTMENT (OUTPATIENT)
Dept: RADIATION ONCOLOGY | Facility: CLINIC | Age: 76
End: 2021-04-01
Attending: RADIOLOGY
Payer: COMMERCIAL

## 2021-04-01 VITALS
WEIGHT: 203.2 LBS | SYSTOLIC BLOOD PRESSURE: 150 MMHG | DIASTOLIC BLOOD PRESSURE: 94 MMHG | HEART RATE: 71 BPM | BODY MASS INDEX: 29.16 KG/M2

## 2021-04-01 DIAGNOSIS — C61 MALIGNANT NEOPLASM OF PROSTATE (H): Primary | ICD-10-CM

## 2021-04-01 PROCEDURE — 77385 HC IMRT TREATMENT DELIVERY, SIMPLE: CPT | Performed by: RADIOLOGY

## 2021-04-01 NOTE — PROGRESS NOTES
WEEKLY MANAGEMENT NOTE  Radiation Oncology          Patient Name: Barry Yang  MRN: [id number]       Pelvis  4518 cGy/ 8000 cGy 23/40         DAILY DOSE:     200  cGy/ day,  5 times/week    DISEASE UNDER TREATMENT: Adenocarcinoma of Prostate  Clinical  T2c N0 M0, [place]'s score: 4+5=9  PSA:57.3(12/11/2020). High AUA score(24/35) and obstructive symptoms.      ADT: Eligard 45 mg (1/13/2021)    SUBJECTIVE:    CTC V5.0 Toxicity Criteria  Fatigue: Grade 1: Fatigue relieved by rest  Pain Score:  0/10     :   Urinary Frequency/Urgency: Grade 1: Increase in frequency or nocturia up to 2X normal  Urinary Incontinence: Grade 0: No change from baseline  Dysuria:Grade 0: No change from baseline  Nocturia: > 5 ( baseline >6-7)  Comment: Baseline urination >20 times/day    GI:  Diarrhea:Grade 0  No change over baseline  Proctitis: Grade 0 No symptom  Comment:      OBJECTIVE:  Wt Readings from Last 2 Encounters:   04/01/21 92.2 kg (203 lb 3.2 oz)   03/25/21 92.7 kg (204 lb 4.8 oz)       IMPRESSION: The patient is tolerating the treatment.  The patient set up, dose, and cone beam CT images were reviewed.      PLAN: Continue radiotherapy. Flomax 0.4 mg BID    PAIN MANAGEMENT PLAN: The patient does not require pain management    CHITO Brunson M.D.  Department of Radiation Oncology  [facility]

## 2021-04-01 NOTE — LETTER
4/1/2021         RE: Barry Yang  [address]  [facility] [address]        Dear Colleague,    Thank you for referring your patient, Barry Yang, to the [facility] RADIATION ONCOLOGY. Please see a copy of my visit note below.    WEEKLY MANAGEMENT NOTE  Radiation Oncology          Patient Name: Barry Yang  MRN: [id number]       Pelvis  4518 cGy/ 8000 cGy 23/40         DAILY DOSE:     200  cGy/ day,  5 times/week    DISEASE UNDER TREATMENT: Adenocarcinoma of Prostate  Clinical  T2c N0 M0, [place]'s score: 4+5=9  PSA:57.3(12/11/2020). High AUA score(24/35) and obstructive symptoms.      ADT: Eligard 45 mg (1/13/2021)    SUBJECTIVE:    CTC V5.0 Toxicity Criteria  Fatigue: Grade 1: Fatigue relieved by rest  Pain Score:  0/10     :   Urinary Frequency/Urgency: Grade 1: Increase in frequency or nocturia up to 2X normal  Urinary Incontinence: Grade 0: No change from baseline  Dysuria:Grade 0: No change from baseline  Nocturia: > 5 ( baseline >6-7)  Comment: Baseline urination >20 times/day    GI:  Diarrhea:Grade 0  No change over baseline  Proctitis: Grade 0 No symptom  Comment:      OBJECTIVE:  Wt Readings from Last 2 Encounters:   04/01/21 92.2 kg (203 lb 3.2 oz)   03/25/21 92.7 kg (204 lb 4.8 oz)       IMPRESSION: The patient is tolerating the treatment.  The patient set up, dose, and cone beam CT images were reviewed.      PLAN: Continue radiotherapy. Flomax 0.4 mg BID    PAIN MANAGEMENT PLAN: The patient does not require pain management    CHITO Brunson M.D.  Department of Radiation Oncology  [facility]

## 2021-04-02 ENCOUNTER — APPOINTMENT (OUTPATIENT)
Dept: RADIATION ONCOLOGY | Facility: CLINIC | Age: 76
End: 2021-04-02
Attending: RADIOLOGY
Payer: COMMERCIAL

## 2021-04-02 PROCEDURE — 77014 PR CT GUIDE FOR PLACEMENT RADIATION THERAPY FIELDS: CPT | Mod: 26 | Performed by: RADIOLOGY

## 2021-04-02 PROCEDURE — 77385 HC IMRT TREATMENT DELIVERY, SIMPLE: CPT | Performed by: RADIOLOGY

## 2021-04-03 ENCOUNTER — HEALTH MAINTENANCE LETTER (OUTPATIENT)
Age: 76
End: 2021-04-03

## 2021-04-06 ENCOUNTER — APPOINTMENT (OUTPATIENT)
Dept: RADIATION ONCOLOGY | Facility: CLINIC | Age: 76
End: 2021-04-06
Attending: RADIOLOGY
Payer: COMMERCIAL

## 2021-04-06 PROCEDURE — 77427 RADIATION TX MANAGEMENT X5: CPT | Performed by: RADIOLOGY

## 2021-04-06 PROCEDURE — 77385 HC IMRT TREATMENT DELIVERY, SIMPLE: CPT | Performed by: RADIOLOGY

## 2021-04-06 PROCEDURE — 77336 RADIATION PHYSICS CONSULT: CPT | Performed by: RADIOLOGY

## 2021-04-06 PROCEDURE — 77014 PR CT GUIDE FOR PLACEMENT RADIATION THERAPY FIELDS: CPT | Mod: 26 | Performed by: RADIOLOGY

## 2021-04-07 ENCOUNTER — APPOINTMENT (OUTPATIENT)
Dept: RADIATION ONCOLOGY | Facility: CLINIC | Age: 76
End: 2021-04-07
Attending: RADIOLOGY
Payer: COMMERCIAL

## 2021-04-07 PROCEDURE — 77014 PR CT GUIDE FOR PLACEMENT RADIATION THERAPY FIELDS: CPT | Mod: 26 | Performed by: RADIOLOGY

## 2021-04-07 PROCEDURE — 77385 HC IMRT TREATMENT DELIVERY, SIMPLE: CPT | Performed by: RADIOLOGY

## 2021-04-08 ENCOUNTER — APPOINTMENT (OUTPATIENT)
Dept: RADIATION ONCOLOGY | Facility: CLINIC | Age: 76
End: 2021-04-08
Attending: RADIOLOGY
Payer: COMMERCIAL

## 2021-04-08 VITALS
HEART RATE: 90 BPM | DIASTOLIC BLOOD PRESSURE: 96 MMHG | WEIGHT: 202.9 LBS | SYSTOLIC BLOOD PRESSURE: 141 MMHG | BODY MASS INDEX: 29.11 KG/M2

## 2021-04-08 DIAGNOSIS — C61 MALIGNANT NEOPLASM OF PROSTATE (H): Primary | ICD-10-CM

## 2021-04-08 PROCEDURE — 77385 HC IMRT TREATMENT DELIVERY, SIMPLE: CPT | Performed by: RADIOLOGY

## 2021-04-08 NOTE — LETTER
4/8/2021         RE: Barry Yang  [address]  [facility] [address]        Dear Colleague,    Thank you for referring your patient, Barry Yang, to the [facility] RADIATION ONCOLOGY. Please see a copy of my visit note below.    WEEKLY MANAGEMENT NOTE  Radiation Oncology          Patient Name: Barry Yang  MRN: [id number]       Pelvis  5518 cGy/ 8000 cGy 28/40         DAILY DOSE:     200  cGy/ day,  5 times/week    DISEASE UNDER TREATMENT: Adenocarcinoma of Prostate  Clinical  T2c N0 M0, [place]'s score: 4+5=9  PSA:57.3(12/11/2020). High AUA score(24/35) and obstructive symptoms.        ADT: Eligard 45 mg (1/13/2021)    SUBJECTIVE:    CTC V5.0 Toxicity Criteria  Fatigue: Grade 1: Fatigue relieved by rest  Pain Score:  0/10     :   Urinary Frequency/Urgency: Grade 1: Increase in frequency or nocturia up to 2X normal  Urinary Incontinence: Grade 0: No change from baseline  Dysuria:Grade 0: No change from baseline  Nocturia: > 5 ( baseline >6-7)  Comment: Baseline urination >20 times/day    GI:  Diarrhea:Grade 0  No change over baseline  Proctitis: Grade 0 No symptom  Comment:      OBJECTIVE:  Wt Readings from Last 2 Encounters:   04/08/21 92 kg (202 lb 14.4 oz)   04/01/21 92.2 kg (203 lb 3.2 oz)       IMPRESSION: The patient is tolerating the treatment.  The patient set up, dose, and cone beam CT images were reviewed.      PLAN: Continue radiotherapy.     PAIN MANAGEMENT PLAN: The patient does not require pain management    CHITO Brunson M.D.  Department of Radiation Oncology  [facility]      Again, thank you for allowing me to participate in the care of your patient.        Sincerely,        Guru Brunson MD

## 2021-04-08 NOTE — PROGRESS NOTES
WEEKLY MANAGEMENT NOTE  Radiation Oncology          Patient Name: Barry Yang  MRN: [id number]       Pelvis  5518 cGy/ 8000 cGy 28/40         DAILY DOSE:     200  cGy/ day,  5 times/week    DISEASE UNDER TREATMENT: Adenocarcinoma of Prostate  Clinical  T2c N0 M0, [place]'s score: 4+5=9  PSA:57.3(12/11/2020). High AUA score(24/35) and obstructive symptoms.        ADT: Eligard 45 mg (1/13/2021)    SUBJECTIVE:    CTC V5.0 Toxicity Criteria  Fatigue: Grade 1: Fatigue relieved by rest  Pain Score:  0/10     :   Urinary Frequency/Urgency: Grade 1: Increase in frequency or nocturia up to 2X normal  Urinary Incontinence: Grade 0: No change from baseline  Dysuria:Grade 0: No change from baseline  Nocturia: > 5 ( baseline >6-7)  Comment: Baseline urination >20 times/day    GI:  Diarrhea:Grade 0  No change over baseline  Proctitis: Grade 0 No symptom  Comment:      OBJECTIVE:  Wt Readings from Last 2 Encounters:   04/08/21 92 kg (202 lb 14.4 oz)   04/01/21 92.2 kg (203 lb 3.2 oz)       IMPRESSION: The patient is tolerating the treatment.  The patient set up, dose, and cone beam CT images were reviewed.      PLAN: Continue radiotherapy.     PAIN MANAGEMENT PLAN: The patient does not require pain management    CHITO Brunson M.D.  Department of Radiation Oncology  [facility]

## 2021-04-09 ENCOUNTER — APPOINTMENT (OUTPATIENT)
Dept: RADIATION ONCOLOGY | Facility: CLINIC | Age: 76
End: 2021-04-09
Attending: RADIOLOGY
Payer: COMMERCIAL

## 2021-04-09 PROCEDURE — 77014 PR CT GUIDE FOR PLACEMENT RADIATION THERAPY FIELDS: CPT | Mod: 26 | Performed by: RADIOLOGY

## 2021-04-09 PROCEDURE — 77385 HC IMRT TREATMENT DELIVERY, SIMPLE: CPT | Performed by: RADIOLOGY

## 2021-04-12 ENCOUNTER — APPOINTMENT (OUTPATIENT)
Dept: RADIATION ONCOLOGY | Facility: CLINIC | Age: 76
End: 2021-04-12
Attending: RADIOLOGY
Payer: COMMERCIAL

## 2021-04-12 PROCEDURE — 77385 HC IMRT TREATMENT DELIVERY, SIMPLE: CPT | Performed by: RADIOLOGY

## 2021-04-12 PROCEDURE — 77014 PR CT GUIDE FOR PLACEMENT RADIATION THERAPY FIELDS: CPT | Mod: 26 | Performed by: RADIOLOGY

## 2021-04-13 ENCOUNTER — APPOINTMENT (OUTPATIENT)
Dept: RADIATION ONCOLOGY | Facility: CLINIC | Age: 76
End: 2021-04-13
Attending: RADIOLOGY
Payer: COMMERCIAL

## 2021-04-13 PROCEDURE — 77014 PR CT GUIDE FOR PLACEMENT RADIATION THERAPY FIELDS: CPT | Mod: 26 | Performed by: RADIOLOGY

## 2021-04-13 PROCEDURE — 77336 RADIATION PHYSICS CONSULT: CPT | Performed by: RADIOLOGY

## 2021-04-13 PROCEDURE — 77385 HC IMRT TREATMENT DELIVERY, SIMPLE: CPT | Performed by: RADIOLOGY

## 2021-04-13 PROCEDURE — 77427 RADIATION TX MANAGEMENT X5: CPT | Performed by: RADIOLOGY

## 2021-04-14 ENCOUNTER — APPOINTMENT (OUTPATIENT)
Dept: RADIATION ONCOLOGY | Facility: CLINIC | Age: 76
End: 2021-04-14
Attending: RADIOLOGY
Payer: COMMERCIAL

## 2021-04-14 PROCEDURE — 77385 HC IMRT TREATMENT DELIVERY, SIMPLE: CPT | Performed by: RADIOLOGY

## 2021-04-14 PROCEDURE — 77014 PR CT GUIDE FOR PLACEMENT RADIATION THERAPY FIELDS: CPT | Mod: 26 | Performed by: RADIOLOGY

## 2021-04-15 ENCOUNTER — APPOINTMENT (OUTPATIENT)
Dept: RADIATION ONCOLOGY | Facility: CLINIC | Age: 76
End: 2021-04-15
Attending: RADIOLOGY
Payer: COMMERCIAL

## 2021-04-15 VITALS
BODY MASS INDEX: 29.27 KG/M2 | WEIGHT: 204 LBS | HEART RATE: 90 BPM | DIASTOLIC BLOOD PRESSURE: 87 MMHG | SYSTOLIC BLOOD PRESSURE: 152 MMHG

## 2021-04-15 DIAGNOSIS — C61 MALIGNANT NEOPLASM OF PROSTATE (H): Primary | ICD-10-CM

## 2021-04-15 PROCEDURE — 77385 HC IMRT TREATMENT DELIVERY, SIMPLE: CPT | Performed by: RADIOLOGY

## 2021-04-15 NOTE — PROGRESS NOTES
WEEKLY MANAGEMENT NOTE  Radiation Oncology          Patient Name: Barry Yang  MRN: [id number]       Pelvis  6318 cGy/ 8000 cGy 32/40         DAILY DOSE:     200  cGy/ day,  5 times/week    DISEASE UNDER TREATMENT: Adenocarcinoma of Prostate  Clinical  T2c N0 M0, [place]'s score: 4+5=9  PSA:57.3(12/11/2020). High AUA score(24/35) and obstructive symptoms.        ADT: Eligard 45 mg (1/13/2021)    SUBJECTIVE:    CTC V5.0 Toxicity Criteria  Fatigue: Grade 1: Fatigue relieved by rest  Pain Score:  0/10     :   Urinary Frequency/Urgency: Grade 1: Increase in frequency or nocturia up to 2X normal  Urinary Incontinence: Grade 0: No change from baseline  Dysuria:Grade 0: No change from baseline  Nocturia: > 5 ( baseline >6-7)  Comment: Baseline urination >20 times/day    GI:  Diarrhea:Grade 1  Increase of <4 stools/day over baseline  Proctitis: Grade 0 No symptom  Comment:      OBJECTIVE:BP (!) 152/87   Pulse 90   Wt 92.5 kg (204 lb)   BMI 29.27 kg/m    Wt Readings from Last 2 Encounters:   04/15/21 92.5 kg (204 lb)   04/08/21 92 kg (202 lb 14.4 oz)       IMPRESSION: The patient is tolerating the treatment.  The patient set up, dose, and cone beam CT images were reviewed.      PLAN: Continue radiotherapy.     PAIN MANAGEMENT PLAN: The patient does not require pain management    CHITO Brunson M.D.  Department of Radiation Oncology  [facility]

## 2021-04-15 NOTE — LETTER
4/15/2021         RE: Barry Yang  [address]  [facility] [address]        Dear Colleague,    Thank you for referring your patient, Barry Yang, to the [facility] RADIATION ONCOLOGY. Please see a copy of my visit note below.    WEEKLY MANAGEMENT NOTE  Radiation Oncology          Patient Name: Barry Yang  MRN: [id number]       Pelvis  6318 cGy/ 8000 cGy 32/40         DAILY DOSE:     200  cGy/ day,  5 times/week    DISEASE UNDER TREATMENT: Adenocarcinoma of Prostate  Clinical  T2c N0 M0, [place]ina's score: 4+5=9  PSA:57.3(12/11/2020). High AUA score(24/35) and obstructive symptoms.        ADT: Eligard 45 mg (1/13/2021)    SUBJECTIVE:    CTC V5.0 Toxicity Criteria  Fatigue: Grade 1: Fatigue relieved by rest  Pain Score:  0/10     :   Urinary Frequency/Urgency: Grade 1: Increase in frequency or nocturia up to 2X normal  Urinary Incontinence: Grade 0: No change from baseline  Dysuria:Grade 0: No change from baseline  Nocturia: > 5 ( baseline >6-7)  Comment: Baseline urination >20 times/day    GI:  Diarrhea:Grade 1  Increase of <4 stools/day over baseline  Proctitis: Grade 0 No symptom  Comment:      OBJECTIVE:BP (!) 152/87   Pulse 90   Wt 92.5 kg (204 lb)   BMI 29.27 kg/m    Wt Readings from Last 2 Encounters:   04/15/21 92.5 kg (204 lb)   04/08/21 92 kg (202 lb 14.4 oz)       IMPRESSION: The patient is tolerating the treatment.  The patient set up, dose, and cone beam CT images were reviewed.      PLAN: Continue radiotherapy.     PAIN MANAGEMENT PLAN: The patient does not require pain management    CHITO Brunson M.D.  Department of Radiation Oncology  [facility]

## 2021-04-16 ENCOUNTER — APPOINTMENT (OUTPATIENT)
Dept: RADIATION ONCOLOGY | Facility: CLINIC | Age: 76
End: 2021-04-16
Attending: RADIOLOGY
Payer: COMMERCIAL

## 2021-04-16 PROCEDURE — 77385 HC IMRT TREATMENT DELIVERY, SIMPLE: CPT | Performed by: RADIOLOGY

## 2021-04-16 PROCEDURE — 77014 PR CT GUIDE FOR PLACEMENT RADIATION THERAPY FIELDS: CPT | Mod: 26 | Performed by: RADIOLOGY

## 2021-04-19 ENCOUNTER — APPOINTMENT (OUTPATIENT)
Dept: RADIATION ONCOLOGY | Facility: CLINIC | Age: 76
End: 2021-04-19
Attending: RADIOLOGY
Payer: COMMERCIAL

## 2021-04-19 PROCEDURE — 77385 HC IMRT TREATMENT DELIVERY, SIMPLE: CPT | Performed by: RADIOLOGY

## 2021-04-19 PROCEDURE — 77014 PR CT GUIDE FOR PLACEMENT RADIATION THERAPY FIELDS: CPT | Mod: 26 | Performed by: RADIOLOGY

## 2021-04-20 ENCOUNTER — APPOINTMENT (OUTPATIENT)
Dept: RADIATION ONCOLOGY | Facility: CLINIC | Age: 76
End: 2021-04-20
Attending: RADIOLOGY
Payer: COMMERCIAL

## 2021-04-20 PROCEDURE — 77385 HC IMRT TREATMENT DELIVERY, SIMPLE: CPT | Performed by: RADIOLOGY

## 2021-04-20 PROCEDURE — 77336 RADIATION PHYSICS CONSULT: CPT | Performed by: RADIOLOGY

## 2021-04-20 PROCEDURE — 77427 RADIATION TX MANAGEMENT X5: CPT | Performed by: RADIOLOGY

## 2021-04-20 PROCEDURE — 77014 PR CT GUIDE FOR PLACEMENT RADIATION THERAPY FIELDS: CPT | Mod: 26 | Performed by: RADIOLOGY

## 2021-04-21 ENCOUNTER — APPOINTMENT (OUTPATIENT)
Dept: RADIATION ONCOLOGY | Facility: CLINIC | Age: 76
End: 2021-04-21
Attending: RADIOLOGY
Payer: COMMERCIAL

## 2021-04-21 PROCEDURE — 77385 HC IMRT TREATMENT DELIVERY, SIMPLE: CPT | Performed by: RADIOLOGY

## 2021-04-21 PROCEDURE — 77014 PR CT GUIDE FOR PLACEMENT RADIATION THERAPY FIELDS: CPT | Mod: 26 | Performed by: RADIOLOGY

## 2021-04-22 ENCOUNTER — OFFICE VISIT (OUTPATIENT)
Dept: RADIATION ONCOLOGY | Facility: CLINIC | Age: 76
End: 2021-04-22
Attending: RADIOLOGY
Payer: COMMERCIAL

## 2021-04-22 VITALS — BODY MASS INDEX: 29.36 KG/M2 | WEIGHT: 204.6 LBS

## 2021-04-22 DIAGNOSIS — C61 MALIGNANT NEOPLASM OF PROSTATE (H): Primary | ICD-10-CM

## 2021-04-22 PROCEDURE — 77385 HC IMRT TREATMENT DELIVERY, SIMPLE: CPT | Performed by: RADIOLOGY

## 2021-04-22 NOTE — LETTER
4/22/2021         RE: Barry Yang  [address]  [facility] [address]        Dear Colleague,    Thank you for referring your patient, Barry Yang, to the [facility] RADIATION ONCOLOGY. Please see a copy of my visit note below.    WEEKLY MANAGEMENT NOTE  Radiation Oncology          Patient Name: Barry Yang  MRN: [id number]       Pelvis  7318 cGy/ 8000 cGy 37/40         DAILY DOSE:     200  cGy/ day,  5 times/week    DISEASE UNDER TREATMENT: Adenocarcinoma of Prostate  Clinical  T2c N0 M0, Malina's score: 4+5=9  PSA:57.3(12/11/2020). High AUA score(24/35) and obstructive symptoms.        ADT: Eligard 45 mg (1/13/2021) plan min 18M    SUBJECTIVE:    CTC V5.0 Toxicity Criteria  Fatigue: Grade 1: Fatigue relieved by rest  Pain Score:  0/10     :   Urinary Frequency/Urgency: Grade 1: Increase in frequency or nocturia up to 2X normal  Urinary Incontinence: Grade 0: No change from baseline  Dysuria:Grade 0: No change from baseline  Nocturia: > 5 ( baseline >6-7)  Comment: Baseline urination >20 times/day    GI:  Diarrhea:Grade 1  Increase of <4 stools/day over baseline  Proctitis: Grade 0 No symptom  Comment:      OBJECTIVE:Wt 92.8 kg (204 lb 9.6 oz)   BMI 29.36 kg/m    Wt Readings from Last 2 Encounters:   04/22/21 92.8 kg (204 lb 9.6 oz)   04/15/21 92.5 kg (204 lb)       IMPRESSION: The patient is tolerating the treatment.  The patient set up, dose, and cone beam CT images were reviewed.      PLAN: Complete radiotherapy with follow up at the [facility].     PAIN MANAGEMENT PLAN: The patient does not require pain management    CHITO Brunson M.D.  Department of Radiation Oncology  [facility]

## 2021-04-22 NOTE — PROGRESS NOTES
WEEKLY MANAGEMENT NOTE  Radiation Oncology          Patient Name: Barry Yang  MRN: [id number]       Pelvis  7318 cGy/ 8000 cGy 37/40         DAILY DOSE:     200  cGy/ day,  5 times/week    DISEASE UNDER TREATMENT: Adenocarcinoma of Prostate  Clinical  T2c N0 M0, [place]'s score: 4+5=9  PSA:57.3(12/11/2020). High AUA score(24/35) and obstructive symptoms.        ADT: Eligard 45 mg (1/13/2021) plan min 18M    SUBJECTIVE:    CTC V5.0 Toxicity Criteria  Fatigue: Grade 1: Fatigue relieved by rest  Pain Score:  0/10     :   Urinary Frequency/Urgency: Grade 1: Increase in frequency or nocturia up to 2X normal  Urinary Incontinence: Grade 0: No change from baseline  Dysuria:Grade 0: No change from baseline  Nocturia: > 5 ( baseline >6-7)  Comment: Baseline urination >20 times/day    GI:  Diarrhea:Grade 1  Increase of <4 stools/day over baseline  Proctitis: Grade 0 No symptom  Comment:      OBJECTIVE:Wt 92.8 kg (204 lb 9.6 oz)   BMI 29.36 kg/m    Wt Readings from Last 2 Encounters:   04/22/21 92.8 kg (204 lb 9.6 oz)   04/15/21 92.5 kg (204 lb)       IMPRESSION: The patient is tolerating the treatment.  The patient set up, dose, and cone beam CT images were reviewed.      PLAN: Complete radiotherapy with follow up at the [facility].     PAIN MANAGEMENT PLAN: The patient does not require pain management    CHITO Brunson M.D.  Department of Radiation Oncology  [facility]

## 2021-04-23 ENCOUNTER — APPOINTMENT (OUTPATIENT)
Dept: RADIATION ONCOLOGY | Facility: CLINIC | Age: 76
End: 2021-04-23
Attending: RADIOLOGY
Payer: COMMERCIAL

## 2021-04-23 PROCEDURE — 77385 HC IMRT TREATMENT DELIVERY, SIMPLE: CPT | Performed by: RADIOLOGY

## 2021-04-23 PROCEDURE — 77014 PR CT GUIDE FOR PLACEMENT RADIATION THERAPY FIELDS: CPT | Mod: 26 | Performed by: RADIOLOGY

## 2021-04-26 ENCOUNTER — APPOINTMENT (OUTPATIENT)
Dept: RADIATION ONCOLOGY | Facility: CLINIC | Age: 76
End: 2021-04-26
Attending: RADIOLOGY
Payer: COMMERCIAL

## 2021-04-26 PROCEDURE — 77014 PR CT GUIDE FOR PLACEMENT RADIATION THERAPY FIELDS: CPT | Mod: 26 | Performed by: RADIOLOGY

## 2021-04-26 PROCEDURE — 77385 HC IMRT TREATMENT DELIVERY, SIMPLE: CPT | Performed by: RADIOLOGY

## 2021-04-27 ENCOUNTER — APPOINTMENT (OUTPATIENT)
Dept: RADIATION ONCOLOGY | Facility: CLINIC | Age: 76
End: 2021-04-27
Attending: RADIOLOGY
Payer: COMMERCIAL

## 2021-04-27 PROCEDURE — 77336 RADIATION PHYSICS CONSULT: CPT | Performed by: RADIOLOGY

## 2021-04-27 PROCEDURE — 77385 HC IMRT TREATMENT DELIVERY, SIMPLE: CPT | Performed by: RADIOLOGY

## 2021-04-27 PROCEDURE — 77427 RADIATION TX MANAGEMENT X5: CPT | Performed by: RADIOLOGY

## 2021-04-27 PROCEDURE — 77014 PR CT GUIDE FOR PLACEMENT RADIATION THERAPY FIELDS: CPT | Mod: 26 | Performed by: RADIOLOGY

## 2021-05-12 ENCOUNTER — TRANSCRIBE ORDERS (OUTPATIENT)
Dept: OTHER | Age: 76
End: 2021-05-12

## 2021-05-12 DIAGNOSIS — Z01.00 ENCOUNTER FOR EXAMINATION OF EYES AND VISION WITHOUT ABNORMAL FINDINGS: Primary | ICD-10-CM

## 2021-06-23 ENCOUNTER — OFFICE VISIT (OUTPATIENT)
Dept: OPHTHALMOLOGY | Facility: CLINIC | Age: 76
End: 2021-06-23
Payer: COMMERCIAL

## 2021-06-23 DIAGNOSIS — H52.4 PRESBYOPIA: ICD-10-CM

## 2021-06-23 DIAGNOSIS — C61 CANCER OF PROSTATE (H): Primary | ICD-10-CM

## 2021-06-23 PROCEDURE — 92004 COMPRE OPH EXAM NEW PT 1/>: CPT | Performed by: OPTOMETRIST

## 2021-06-23 ASSESSMENT — TONOMETRY
OD_IOP_MMHG: 16
OS_IOP_MMHG: 16
IOP_METHOD: ICARE

## 2021-06-23 ASSESSMENT — SLIT LAMP EXAM - LIDS
COMMENTS: NORMAL
COMMENTS: NORMAL

## 2021-06-23 ASSESSMENT — EXTERNAL EXAM - LEFT EYE: OS_EXAM: NORMAL

## 2021-06-23 ASSESSMENT — REFRACTION_WEARINGRX
OD_AXIS: 170
OD_SPHERE: +0.50
OD_ADD: +2.75
OS_SPHERE: +1.25
SPECS_TYPE: TRIFOCAL
OD_CYLINDER: +1.00
OS_CYLINDER: +0.25
OS_AXIS: 054
OS_ADD: +2.75

## 2021-06-23 ASSESSMENT — VISUAL ACUITY
OS_CC: 20/20
METHOD: SNELLEN - LINEAR
CORRECTION_TYPE: GLASSES
OD_CC+: -2
OD_CC: 20/20

## 2021-06-23 ASSESSMENT — EXTERNAL EXAM - RIGHT EYE: OD_EXAM: NORMAL

## 2021-06-23 ASSESSMENT — CONF VISUAL FIELD
METHOD: COUNTING FINGERS
OS_NORMAL: 1
OD_NORMAL: 1

## 2021-06-23 ASSESSMENT — CUP TO DISC RATIO
OD_RATIO: 0.6
OS_RATIO: 0.6

## 2021-06-23 NOTE — NURSING NOTE
"Chief Complaints and History of Present Illnesses   Patient presents with     COMPREHENSIVE EYE EXAM     Chief Complaint(s) and History of Present Illness(es)     COMPREHENSIVE EYE EXAM     Laterality: both eyes    Associated symptoms: floaters.  Negative for flashes, dryness, tearing and eye pain    Pain scale: 0/10              Comments     Pt notes that he has floaters in BE, have been present for 6 months, hasn't increased in amount, size, or color change. \"the corner of my eyes in the morning have matter upon waking\" per pt, denies gtts or kostas. Pt notes that his current gls don't fit well, wondering if he needs trifocal, prefers just a bifocal.     Pt is in the process of radiation for prostate, in between tx right now, will start up again in a few wks per pt. After having radiation he will find that he gets a deep ache in each eye, switches between the two, no pattern to it, wondering if it has to do with radiation. Getting second inj of elgaurd, hormone inj, getting second one in 2 weeks.     Penny Juarez COT June 23, 2021 11:43 AM                  "

## 2021-06-23 NOTE — PROGRESS NOTES
Assessment/Plan  (C61) Cancer of prostate (H)  (primary encounter diagnosis)  Comment: Patient receives radiation therapy  Plan: Recommend monitoring every 6 months with dilated exam- either at this clinic or at [facility]. Return to clinic sooner with vision changes.     (H52.4) Presbyopia  Comment: No refraction today  Plan: Monitor as needed.       Complete documentation of historical and exam elements from today's encounter can  be found in the full encounter summary report (not reduplicated in this progress  note). I personally obtained the chief complaint(s) and history of present illness. I  confirmed and edited as necessary the review of systems, past medical/surgical  history, family history, social history, and examination findings as documented by  others; and I examined the patient myself. I personally reviewed the relevant tests,  images, and reports as documented above. I formulated and edited as necessary the  assessment and plan and discussed the findings and management plan with the  patient and family.    Rod Bowers OD

## 2021-09-12 ENCOUNTER — HEALTH MAINTENANCE LETTER (OUTPATIENT)
Age: 76
End: 2021-09-12

## 2022-04-24 ENCOUNTER — HEALTH MAINTENANCE LETTER (OUTPATIENT)
Age: 77
End: 2022-04-24

## 2022-11-19 ENCOUNTER — HEALTH MAINTENANCE LETTER (OUTPATIENT)
Age: 77
End: 2022-11-19

## 2023-06-01 ENCOUNTER — HEALTH MAINTENANCE LETTER (OUTPATIENT)
Age: 78
End: 2023-06-01

## 2024-06-16 ENCOUNTER — HEALTH MAINTENANCE LETTER (OUTPATIENT)
Age: 79
End: 2024-06-16

## 2024-07-09 ENCOUNTER — DOCUMENTATION ONLY (OUTPATIENT)
Dept: CARDIOLOGY | Facility: CLINIC | Age: 79
End: 2024-07-09
Payer: MEDICARE

## 2024-07-17 ENCOUNTER — ANCILLARY PROCEDURE (OUTPATIENT)
Dept: NUCLEAR MEDICINE | Facility: CLINIC | Age: 79
End: 2024-07-17
Attending: STUDENT IN AN ORGANIZED HEALTH CARE EDUCATION/TRAINING PROGRAM
Payer: COMMERCIAL

## 2024-07-17 DIAGNOSIS — G20.C PARKINSONISM, UNSPECIFIED (H): ICD-10-CM

## 2024-07-17 PROCEDURE — 78803 RP LOCLZJ TUM SPECT 1 AREA: CPT | Performed by: STUDENT IN AN ORGANIZED HEALTH CARE EDUCATION/TRAINING PROGRAM

## 2024-07-17 PROCEDURE — A9584 IODINE I-123 IOFLUPANE: HCPCS | Mod: JZ | Performed by: STUDENT IN AN ORGANIZED HEALTH CARE EDUCATION/TRAINING PROGRAM

## 2024-07-17 RX ORDER — IOFLUPANE I-123 2 MCI/ML
4-6 INJECTION, SOLUTION INTRAVENOUS ONCE
Status: COMPLETED | OUTPATIENT
Start: 2024-07-17 | End: 2024-07-17

## 2024-07-17 RX ADMIN — IOFLUPANE I-123 5.3 MILLICURIE: 2 INJECTION, SOLUTION INTRAVENOUS at 10:09

## 2024-07-17 RX ADMIN — Medication 130 MG: at 08:24

## 2024-08-12 ENCOUNTER — HOSPITAL ENCOUNTER (OUTPATIENT)
Dept: PET IMAGING | Facility: CLINIC | Age: 79
Setting detail: NUCLEAR MEDICINE
Discharge: HOME | End: 2024-08-12
Attending: NURSE PRACTITIONER | Admitting: NURSE PRACTITIONER
Payer: COMMERCIAL

## 2024-08-12 DIAGNOSIS — D07.5 CARCINOMA IN SITU OF PROSTATE: ICD-10-CM

## 2024-08-12 PROCEDURE — 78815 PET IMAGE W/CT SKULL-THIGH: CPT | Mod: 26 | Performed by: STUDENT IN AN ORGANIZED HEALTH CARE EDUCATION/TRAINING PROGRAM

## 2024-08-12 PROCEDURE — A9596 HC RX 343: HCPCS

## 2024-08-12 PROCEDURE — 78815 PET IMAGE W/CT SKULL-THIGH: CPT | Mod: PS

## 2024-08-12 PROCEDURE — 343N000001 HC RX 343

## 2024-08-12 RX ADMIN — KIT FOR THE PREPARATION OF GALLIUM GA 68 GOZETOTIDE INJECTION 5.41 MILLICURIE: KIT INTRAVENOUS at 13:28

## 2025-06-21 ENCOUNTER — HEALTH MAINTENANCE LETTER (OUTPATIENT)
Age: 80
End: 2025-06-21